# Patient Record
Sex: MALE | Race: WHITE | URBAN - METROPOLITAN AREA
[De-identification: names, ages, dates, MRNs, and addresses within clinical notes are randomized per-mention and may not be internally consistent; named-entity substitution may affect disease eponyms.]

---

## 2024-10-16 ENCOUNTER — TELEPHONE (OUTPATIENT)
Dept: GASTROENTEROLOGY | Facility: CLINIC | Age: 59
End: 2024-10-16
Payer: MEDICAID

## 2024-10-28 ENCOUNTER — TELEPHONE (OUTPATIENT)
Dept: GASTROENTEROLOGY | Facility: CLINIC | Age: 59
End: 2024-10-28
Payer: MEDICAID

## 2024-12-04 ENCOUNTER — APPOINTMENT (OUTPATIENT)
Dept: GASTROENTEROLOGY | Facility: CLINIC | Age: 59
End: 2024-12-04
Payer: MEDICAID

## 2024-12-04 VITALS
BODY MASS INDEX: 24.01 KG/M2 | HEART RATE: 72 BPM | DIASTOLIC BLOOD PRESSURE: 95 MMHG | HEIGHT: 67 IN | SYSTOLIC BLOOD PRESSURE: 141 MMHG | WEIGHT: 153 LBS

## 2024-12-04 DIAGNOSIS — Z12.11 SCREENING FOR COLON CANCER: Primary | ICD-10-CM

## 2024-12-04 PROBLEM — F10.10 ALCOHOL ABUSE: Status: RESOLVED | Noted: 2020-06-05 | Resolved: 2024-12-04

## 2024-12-04 PROBLEM — I63.9 CEREBROVASCULAR ACCIDENT (CVA) (MULTI): Status: ACTIVE | Noted: 2020-06-05

## 2024-12-04 PROBLEM — F17.200 TOBACCO DEPENDENCE SYNDROME: Status: ACTIVE | Noted: 2020-06-05

## 2024-12-04 PROBLEM — S81.809A MULTIPLE OPEN WOUNDS OF LOWER LEG: Status: ACTIVE | Noted: 2020-06-05

## 2024-12-04 PROBLEM — B95.61 BACTEREMIA DUE TO STAPHYLOCOCCUS AUREUS: Status: RESOLVED | Noted: 2020-06-05 | Resolved: 2024-12-04

## 2024-12-04 PROBLEM — E78.5 DYSLIPIDEMIA: Status: ACTIVE | Noted: 2020-06-05

## 2024-12-04 PROBLEM — F19.10 SUBSTANCE ABUSE (MULTI): Status: RESOLVED | Noted: 2020-06-05 | Resolved: 2024-12-04

## 2024-12-04 PROBLEM — F31.32 BIPOLAR AFFECTIVE DISORDER, CURRENTLY DEPRESSED, MODERATE (MULTI): Status: ACTIVE | Noted: 2020-06-05

## 2024-12-04 PROBLEM — R78.81 BACTEREMIA DUE TO STAPHYLOCOCCUS AUREUS: Status: RESOLVED | Noted: 2020-06-05 | Resolved: 2024-12-04

## 2024-12-04 PROCEDURE — 3008F BODY MASS INDEX DOCD: CPT | Performed by: INTERNAL MEDICINE

## 2024-12-04 PROCEDURE — 99203 OFFICE O/P NEW LOW 30 MIN: CPT | Performed by: INTERNAL MEDICINE

## 2024-12-04 RX ORDER — OMEPRAZOLE 20 MG/1
20 CAPSULE, DELAYED RELEASE ORAL
COMMUNITY
Start: 2024-10-10

## 2024-12-04 RX ORDER — QUETIAPINE FUMARATE 50 MG/1
50 TABLET, FILM COATED ORAL NIGHTLY
COMMUNITY
Start: 2024-10-10

## 2024-12-04 RX ORDER — LISINOPRIL 5 MG/1
1 TABLET ORAL
COMMUNITY
Start: 2024-10-10

## 2024-12-04 ASSESSMENT — ENCOUNTER SYMPTOMS
SHORTNESS OF BREATH: 0
ROS GI COMMENTS: AS DETAILED ABOVE.
ARTHRALGIAS: 0
UNEXPECTED WEIGHT CHANGE: 0
PALPITATIONS: 0
TROUBLE SWALLOWING: 0
SORE THROAT: 0
VOICE CHANGE: 0
MYALGIAS: 0
NERVOUS/ANXIOUS: 0
WHEEZING: 0
NUMBNESS: 0
CONFUSION: 0
CHILLS: 0
DIZZINESS: 0
HEADACHES: 0
WEAKNESS: 0
FEVER: 0
BRUISES/BLEEDS EASILY: 0
DYSURIA: 0
SEIZURES: 0
ADENOPATHY: 0
FATIGUE: 0
COUGH: 0
HEMATURIA: 0

## 2024-12-04 NOTE — PATIENT INSTRUCTIONS
You have been scheduled for a colonoscopy.  You were given instructions for preparing for this test in the office today.  If you have questions about these instructions, please call my office at [phone].    After your procedure, you can expect me to talk to you to go over the results of the procedure. If polyps were removed I will usually be able to tell you my initial thoughts about those polyps and give you some idea of when you should have another colonoscopy.    If any polyps are removed during your procedure or if any biopsies are obtained those specimens will go to the pathologists to review under the microscope. Once those results are available they will be sent to me electronically to review. These results will also be available to you at that time through [employer]. I briefly review all of these results by the next business day to determine if there is any urgent information that needs to be communicated to you right away or anything that would significantly change what I told you at the time of the procedure. If there is nothing urgent this is usually a good sign and I will then do a more extensive review of the result and send you a letter with my final recommendations within a week of the result becoming available. If you have questions or need additional information I urge you to call the office at [phone], but I do ask for patience as I spend a good portion of each day performing procedures like the one you are scheduled for and during those times I am not able to take or return routine phone calls.    You were also given information regarding the schedule for your procedure including the time that you need to arrive to the endoscopy unit.  You will also be contacted 2-3 day prior to your procedure to confirm the final arrival time.  If you have questions about this or if you need to cancel or change this appointment please call my office at [phone].       Follow up with GI as  needed.

## 2024-12-04 NOTE — PROGRESS NOTES
[facility] Gastroenterology    ASSESSMENT and PLAN:       Gurpreet Faye is a 59 y.o. male with a significant past medical history of CVA, HTN, and GERD who presents for consultation requested by his primary care provider (KISHOR Boo) for a discussion regarding colonoscopy.       Colon cancer screening  Due for screening colonoscopy.  Asymptomatic at this time.  No previous difficulties with sedation.  No antiplatelet/antithrombotic use. There is a family history of colon cancer in his mother.  - colonoscopy scheduled in endo  - OTC bowel prep discussed with patient  - instructions for procedure discussed with patient in the office today and hard copy given to patient today      Follow up with GI as needed.          You White MD        Senior Attending Physician, Gastroenterology    [facility]    Clinical   [facility] [employer]        Subjective   HISTORY OF PRESENT ILLNESS:     Chief Complaint  Colonoscopy    History Of Present Illness:    Gurpreet Faye is a 59 y.o. male with a significant past medical history of CVA, HTN, and GERD who presents for consultation requested by his primary care provider (KISHOR Boo) for a discussion regarding colonoscopy.     There are no notes from his PCP in the EMR. A scanned note dated 10/10/2024 states that he is due for a screening colonoscopy. He reported had a colonoscopy in 2008 that was normal. His PCP also reported a family history of colon cancer in his mother.    Today he says that he has never had a colonoscopy before. He denies any GI symptoms. He does have a history of a CVA and he has some residual difficulties walking from that, but otherwise he denies any residual deficit. He denies any history of heart disease. He denies any previous difficulties with sedation/anesthesia. No current anticoagulation or antiplatelet  use.      Patient denies any heartburn/GERD, N/V, dysphagia, odynophagia, abdominal pain, diarrhea, constipation, hematemesis, hematochezia, melena, or weight loss.    Endoscopy History:  6/2/2020 - EGD: LA grade C esophagitis, gastric edema, PEG placed, normal duodenum      Review of systems:   Review of Systems   Constitutional:  Negative for chills, fatigue, fever and unexpected weight change.   HENT:  Negative for congestion, sneezing, sore throat, trouble swallowing and voice change.    Respiratory:  Negative for cough, shortness of breath and wheezing.    Cardiovascular:  Negative for chest pain, palpitations and leg swelling.   Gastrointestinal:         As detailed above.   Genitourinary:  Negative for dysuria and hematuria.   Musculoskeletal:  Negative for arthralgias and myalgias.   Skin:  Negative for pallor and rash.   Neurological:  Negative for dizziness, seizures, syncope, weakness, numbness and headaches.        +difficulty walking   Hematological:  Negative for adenopathy. Does not bruise/bleed easily.   Psychiatric/Behavioral:  Negative for confusion. The patient is not nervous/anxious.    All other systems reviewed and are negative.      I performed a complete 10 point review of systems and it is negative except as noted in HPI or above.      PAST HISTORIES:       Past Medical History:  Patient Active Problem List   Diagnosis    Tobacco dependence syndrome    Multiple open wounds of lower leg    Dyslipidemia    Cerebrovascular accident (CVA) (Multi)    Bipolar affective disorder, currently depressed, moderate (Multi)     He has a past medical history of Bacteremia due to Staphylococcus aureus (06/05/2020).    Past Surgical History:  He has a past surgical history that includes CT angio head w and wo IV contrast (5/19/2020) and CT angio neck (5/19/2020).      Social History:  He reports that he has never smoked. He does not have any smokeless tobacco history on file. He reports that he does not  "drink alcohol and does not use drugs.    Family History:  His mother had colon cancer diagnosed after the age of 65 (around age 65 or 67). Otherwise he denies any family history of pancreatitis, Crohn's, gastroesophageal cancer, or ulcerative colitis.    Family History   Problem Relation Name Age of Onset    Colon cancer Mother          Allergies:  Patient has no known allergies.      Objective   OBJECTIVE:       Last Recorded Vitals:  Vitals:    12/04/24 1543   BP: (!) 141/95   Pulse: 72   Weight: 69.4 kg (153 lb)   Height: 1.702 m (5' 7\")     BP (!) 141/95   Pulse 72   Ht 1.702 m (5' 7\")   Wt 69.4 kg (153 lb)   BMI 23.96 kg/m²      Physical Exam:    Physical Exam  Vitals reviewed.   Constitutional:       General: He is not in acute distress.     Appearance: He is not ill-appearing.   HENT:      Head: Normocephalic and atraumatic.   Eyes:      General: No scleral icterus.  Cardiovascular:      Rate and Rhythm: Normal rate and regular rhythm.      Pulses: Normal pulses.      Heart sounds: Normal heart sounds. No murmur heard.  Pulmonary:      Effort: Pulmonary effort is normal. No respiratory distress.      Breath sounds: Normal breath sounds. No wheezing.   Abdominal:      General: Bowel sounds are normal.      Palpations: Abdomen is soft.      Tenderness: There is no abdominal tenderness. There is no rebound.      Comments: Scar from previous PEG tube   Musculoskeletal:         General: No swelling or deformity.      Comments: Slow, shuffling gait   Skin:     General: Skin is warm and dry.      Coloration: Skin is not jaundiced.      Findings: No rash.   Neurological:      General: No focal deficit present.      Mental Status: He is alert and oriented to person, place, and time.   Psychiatric:         Mood and Affect: Mood normal.         Behavior: Behavior normal.         Thought Content: Thought content normal.         Judgment: Judgment normal.         Home Medications:  Prior to Admission medications  " "  Not on File         Relevant Results Recent labs reviewed in the EMR.    Lab Results   Component Value Date/Time    WBC 8.2 06/05/2020 0348    HGB 10.0 (L) 06/05/2020 0348    HGB 10.0 (L) 06/01/2020 0904    HGB 9.3 (L) 05/31/2020 0751    MCV 95 06/05/2020 0348     06/05/2020 0348     06/01/2020 0904     05/31/2020 0751       Lab Results   Component Value Date/Time     (L) 06/05/2020 0348    K 3.5 06/05/2020 0348     06/05/2020 0348    BUN 5 (L) 06/05/2020 0348    CREATININE 1.00 06/05/2020 0348    CREATININE 1.04 06/04/2020 0407       Lab Results   Component Value Date/Time    BILITOT 0.9 05/16/2020 0619    BILITOT 1.1 05/15/2020 1727    ALKPHOS 57 05/16/2020 0619    ALKPHOS 62 05/15/2020 1727    AST 26 05/16/2020 0619    AST 28 05/15/2020 1727    ALT 29 05/16/2020 0619    ALT 33 05/15/2020 1727       No results found for: \"CRP\", \"CALPS\"    Radiology: Imaging reviewed in the EMR.        "

## 2024-12-04 NOTE — H&P (VIEW-ONLY)
[facility] Gastroenterology    ASSESSMENT and PLAN:       Gurpreet Faye is a 59 y.o. male with a significant past medical history of CVA, HTN, and GERD who presents for consultation requested by his primary care provider (KISHOR Boo) for a discussion regarding colonoscopy.       Colon cancer screening  Due for screening colonoscopy.  Asymptomatic at this time.  No previous difficulties with sedation.  No antiplatelet/antithrombotic use. There is a family history of colon cancer in his mother.  - colonoscopy scheduled in endo  - OTC bowel prep discussed with patient  - instructions for procedure discussed with patient in the office today and hard copy given to patient today      Follow up with GI as needed.          You White MD        Senior Attending Physician, Gastroenterology    [facility] [place] [facility]    Clinical   [facility] [employer]        Subjective   HISTORY OF PRESENT ILLNESS:     Chief Complaint  Colonoscopy    History Of Present Illness:    Gurpreet Faye is a 59 y.o. male with a significant past medical history of CVA, HTN, and GERD who presents for consultation requested by his primary care provider (KISHOR Boo) for a discussion regarding colonoscopy.     There are no notes from his PCP in the EMR. A scanned note dated 10/10/2024 states that he is due for a screening colonoscopy. He reported had a colonoscopy in 2008 that was normal. His PCP also reported a family history of colon cancer in his mother.    Today he says that he has never had a colonoscopy before. He denies any GI symptoms. He does have a history of a CVA and he has some residual difficulties walking from that, but otherwise he denies any residual deficit. He denies any history of heart disease. He denies any previous difficulties with sedation/anesthesia. No current anticoagulation or antiplatelet  use.      Patient denies any heartburn/GERD, N/V, dysphagia, odynophagia, abdominal pain, diarrhea, constipation, hematemesis, hematochezia, melena, or weight loss.    Endoscopy History:  6/2/2020 - EGD: LA grade C esophagitis, gastric edema, PEG placed, normal duodenum      Review of systems:   Review of Systems   Constitutional:  Negative for chills, fatigue, fever and unexpected weight change.   HENT:  Negative for congestion, sneezing, sore throat, trouble swallowing and voice change.    Respiratory:  Negative for cough, shortness of breath and wheezing.    Cardiovascular:  Negative for chest pain, palpitations and leg swelling.   Gastrointestinal:         As detailed above.   Genitourinary:  Negative for dysuria and hematuria.   Musculoskeletal:  Negative for arthralgias and myalgias.   Skin:  Negative for pallor and rash.   Neurological:  Negative for dizziness, seizures, syncope, weakness, numbness and headaches.        +difficulty walking   Hematological:  Negative for adenopathy. Does not bruise/bleed easily.   Psychiatric/Behavioral:  Negative for confusion. The patient is not nervous/anxious.    All other systems reviewed and are negative.      I performed a complete 10 point review of systems and it is negative except as noted in HPI or above.      PAST HISTORIES:       Past Medical History:  Patient Active Problem List   Diagnosis    Tobacco dependence syndrome    Multiple open wounds of lower leg    Dyslipidemia    Cerebrovascular accident (CVA) (Multi)    Bipolar affective disorder, currently depressed, moderate (Multi)     He has a past medical history of Bacteremia due to Staphylococcus aureus (06/05/2020).    Past Surgical History:  He has a past surgical history that includes CT angio head w and wo IV contrast (5/19/2020) and CT angio neck (5/19/2020).      Social History:  He reports that he has never smoked. He does not have any smokeless tobacco history on file. He reports that he does not  "drink alcohol and does not use drugs.    Family History:  His mother had colon cancer diagnosed after the age of 65 (around age 65 or 67). Otherwise he denies any family history of pancreatitis, Crohn's, gastroesophageal cancer, or ulcerative colitis.    Family History   Problem Relation Name Age of Onset    Colon cancer Mother          Allergies:  Patient has no known allergies.      Objective   OBJECTIVE:       Last Recorded Vitals:  Vitals:    12/04/24 1543   BP: (!) 141/95   Pulse: 72   Weight: 69.4 kg (153 lb)   Height: 1.702 m (5' 7\")     BP (!) 141/95   Pulse 72   Ht 1.702 m (5' 7\")   Wt 69.4 kg (153 lb)   BMI 23.96 kg/m²      Physical Exam:    Physical Exam  Vitals reviewed.   Constitutional:       General: He is not in acute distress.     Appearance: He is not ill-appearing.   HENT:      Head: Normocephalic and atraumatic.   Eyes:      General: No scleral icterus.  Cardiovascular:      Rate and Rhythm: Normal rate and regular rhythm.      Pulses: Normal pulses.      Heart sounds: Normal heart sounds. No murmur heard.  Pulmonary:      Effort: Pulmonary effort is normal. No respiratory distress.      Breath sounds: Normal breath sounds. No wheezing.   Abdominal:      General: Bowel sounds are normal.      Palpations: Abdomen is soft.      Tenderness: There is no abdominal tenderness. There is no rebound.      Comments: Scar from previous PEG tube   Musculoskeletal:         General: No swelling or deformity.      Comments: Slow, shuffling gait   Skin:     General: Skin is warm and dry.      Coloration: Skin is not jaundiced.      Findings: No rash.   Neurological:      General: No focal deficit present.      Mental Status: He is alert and oriented to person, place, and time.   Psychiatric:         Mood and Affect: Mood normal.         Behavior: Behavior normal.         Thought Content: Thought content normal.         Judgment: Judgment normal.         Home Medications:  Prior to Admission medications  " "  Not on File         Relevant Results Recent labs reviewed in the EMR.    Lab Results   Component Value Date/Time    WBC 8.2 06/05/2020 0348    HGB 10.0 (L) 06/05/2020 0348    HGB 10.0 (L) 06/01/2020 0904    HGB 9.3 (L) 05/31/2020 0751    MCV 95 06/05/2020 0348     06/05/2020 0348     06/01/2020 0904     05/31/2020 0751       Lab Results   Component Value Date/Time     (L) 06/05/2020 0348    K 3.5 06/05/2020 0348     06/05/2020 0348    BUN 5 (L) 06/05/2020 0348    CREATININE 1.00 06/05/2020 0348    CREATININE 1.04 06/04/2020 0407       Lab Results   Component Value Date/Time    BILITOT 0.9 05/16/2020 0619    BILITOT 1.1 05/15/2020 1727    ALKPHOS 57 05/16/2020 0619    ALKPHOS 62 05/15/2020 1727    AST 26 05/16/2020 0619    AST 28 05/15/2020 1727    ALT 29 05/16/2020 0619    ALT 33 05/15/2020 1727       No results found for: \"CRP\", \"CALPS\"    Radiology: Imaging reviewed in the EMR.        "

## 2024-12-19 ENCOUNTER — PREP FOR PROCEDURE (OUTPATIENT)
Facility: CLINIC | Age: 59
End: 2024-12-19
Payer: MEDICAID

## 2024-12-20 ENCOUNTER — HOSPITAL ENCOUNTER (OUTPATIENT)
Dept: GASTROENTEROLOGY | Facility: HOSPITAL | Age: 59
Discharge: HOME | End: 2024-12-20
Payer: MEDICAID

## 2024-12-20 ENCOUNTER — ANESTHESIA (OUTPATIENT)
Dept: GASTROENTEROLOGY | Facility: HOSPITAL | Age: 59
End: 2024-12-20
Payer: MEDICAID

## 2024-12-20 ENCOUNTER — ANESTHESIA EVENT (OUTPATIENT)
Dept: GASTROENTEROLOGY | Facility: HOSPITAL | Age: 59
End: 2024-12-20
Payer: MEDICAID

## 2024-12-20 VITALS
RESPIRATION RATE: 16 BRPM | HEART RATE: 52 BPM | TEMPERATURE: 97.4 F | OXYGEN SATURATION: 98 % | DIASTOLIC BLOOD PRESSURE: 80 MMHG | SYSTOLIC BLOOD PRESSURE: 140 MMHG

## 2024-12-20 DIAGNOSIS — Z12.11 ENCOUNTER FOR SCREENING FOR MALIGNANT NEOPLASM OF COLON: ICD-10-CM

## 2024-12-20 PROCEDURE — 3700000001 HC GENERAL ANESTHESIA TIME - INITIAL BASE CHARGE

## 2024-12-20 PROCEDURE — 3700000002 HC GENERAL ANESTHESIA TIME - EACH INCREMENTAL 1 MINUTE

## 2024-12-20 PROCEDURE — 45385 COLONOSCOPY W/LESION REMOVAL: CPT | Performed by: INTERNAL MEDICINE

## 2024-12-20 PROCEDURE — 7100000009 HC PHASE TWO TIME - INITIAL BASE CHARGE

## 2024-12-20 PROCEDURE — 7100000010 HC PHASE TWO TIME - EACH INCREMENTAL 1 MINUTE

## 2024-12-20 PROCEDURE — 2500000004 HC RX 250 GENERAL PHARMACY W/ HCPCS (ALT 636 FOR OP/ED): Performed by: NURSE ANESTHETIST, CERTIFIED REGISTERED

## 2024-12-20 RX ORDER — SODIUM CHLORIDE 0.9 % (FLUSH) 0.9 %
SYRINGE (ML) INJECTION AS NEEDED
Status: DISCONTINUED | OUTPATIENT
Start: 2024-12-20 | End: 2024-12-20

## 2024-12-20 RX ORDER — LIDOCAINE HYDROCHLORIDE 20 MG/ML
INJECTION, SOLUTION INFILTRATION; PERINEURAL AS NEEDED
Status: DISCONTINUED | OUTPATIENT
Start: 2024-12-20 | End: 2024-12-20

## 2024-12-20 RX ORDER — PROPOFOL 10 MG/ML
INJECTION, EMULSION INTRAVENOUS AS NEEDED
Status: DISCONTINUED | OUTPATIENT
Start: 2024-12-20 | End: 2024-12-20

## 2024-12-20 SDOH — HEALTH STABILITY: MENTAL HEALTH: CURRENT SMOKER: 0

## 2024-12-20 ASSESSMENT — COLUMBIA-SUICIDE SEVERITY RATING SCALE - C-SSRS
6. HAVE YOU EVER DONE ANYTHING, STARTED TO DO ANYTHING, OR PREPARED TO DO ANYTHING TO END YOUR LIFE?: NO
1. IN THE PAST MONTH, HAVE YOU WISHED YOU WERE DEAD OR WISHED YOU COULD GO TO SLEEP AND NOT WAKE UP?: NO
2. HAVE YOU ACTUALLY HAD ANY THOUGHTS OF KILLING YOURSELF?: NO

## 2024-12-20 ASSESSMENT — PAIN - FUNCTIONAL ASSESSMENT
PAIN_FUNCTIONAL_ASSESSMENT: 0-10

## 2024-12-20 ASSESSMENT — PAIN SCALES - GENERAL
PAINLEVEL_OUTOF10: 0 - NO PAIN
PAINLEVEL_OUTOF10: 0 - NO PAIN
PAIN_LEVEL: 0
PAINLEVEL_OUTOF10: 0 - NO PAIN

## 2024-12-20 NOTE — ANESTHESIA PREPROCEDURE EVALUATION
Patient: Gurpreet Faye    Procedure Information       Anesthesia Start Date/Time: 12/20/24 1217    Scheduled providers: You White MD    Procedure: COLONOSCOPY    Location: [facility]            Relevant Problems   Anesthesia (within normal limits)      Neuro   (+) Bipolar affective disorder, currently depressed, moderate (Multi)   (+) Cerebrovascular accident (CVA) (Multi)       Clinical information reviewed:   Tobacco  Allergies  Meds   Med Hx  Surg Hx   Fam Hx  Soc Hx        NPO Detail:  NPO/Void Status  Carbohydrate Drink Given Prior to Surgery? : N  Date of Last Liquid: 12/20/24  Time of Last Liquid: 0530  Date of Last Solid: 12/18/24  Time of Last Solid: 1800  Last Intake Type: Clear fluids  Time of Last Void: 1118         Physical Exam    Airway  Mallampati: II  TM distance: >3 FB  Neck ROM: full     Cardiovascular - normal exam  Rhythm: regular  Rate: normal     Dental - normal exam     Pulmonary - normal exam     Abdominal - normal exam             Anesthesia Plan    History of general anesthesia?: yes  History of complications of general anesthesia?: no    ASA 3     MAC     The patient is not a current smoker.    intravenous induction   Anesthetic plan and risks discussed with patient.

## 2024-12-20 NOTE — LETTER
December 30, 2024     Gurpreet Faye  [address]  [place] [address]      Dear Mr. Faye:    Below are the results from your recent visit:    The polyp that I removed during your recent colonoscopy was a tubular adenoma on pathology.  This type of polyp is not a cancer, but it is the type of polyp that could grow into a cancer over time.  Any polyps that were removed will not grow into a cancer, but having polyps like this can increase your risk of developing other polyps or eventually a cancer.  Because of that risk I would recommend that you have another colonoscopy in about 7-10 years (subcentimeter tubular adenoma) to look for other polyps.     If you have any other questions or concerns please do not hesitate to call me at my office at [phone].     Repeat Colonoscopy Interval: 7-10 years                Sincerely,        Dr. Vic Carlson for Dr. You White              Resulted Orders   Surgical Pathology Exam   Result Value Ref Range    Case Report       Surgical Pathology                                Case: [id number]                                  Authorizing Provider:  You White MD        Collected:           12/20/2024 1243              Ordering Location:     [facility]    Received:            12/20/2024 1315                                     [facility]                                                                     Pathologist:           Baldomero Lr MD                                                             Specimen:    RECTUM POLYPECTOMY                                                                         FINAL DIAGNOSIS       RECTUM, POLYPECTOMY:  - FRAGMENTS OF TUBULAR ADENOMA.              By the signature on this report, the individual or group listed as making the Final Interpretation/Diagnosis certifies that they have reviewed this case.       Clinical History       Z12.11 - Encounter for screening for malignant neoplasm of colon  "[ICD-10-CM]      Gross Description       A: Received in formalin, labeled with the patient's name and hospital number and \"rectal polyp\", is one light tan polypoid, soft tissue fragment measuring 0.7 x 0.4 x 0.3 cm.  The line of resection is inked.  The specimen is submitted in toto in one cassette.  LMP              "

## 2024-12-20 NOTE — ANESTHESIA POSTPROCEDURE EVALUATION
Patient: Gurpreet Faye    Procedure Summary       Date: 12/20/24 Room / Location: [facility]    Anesthesia Start: 1217 Anesthesia Stop: 1252    Procedure: COLONOSCOPY Diagnosis: Encounter for screening for malignant neoplasm of colon    Scheduled Providers: You White MD Responsible Provider: JUAN Cristina    Anesthesia Type: MAC ASA Status: 3            Anesthesia Type: MAC    Vitals Value Taken Time   /80 12/20/24 1312   Temp 36.3 °C (97.4 °F) 12/20/24 1312   Pulse 52 12/20/24 1312   Resp 16 12/20/24 1312   SpO2 98 % 12/20/24 1312       Anesthesia Post Evaluation    Patient location during evaluation: bedside  Patient participation: complete - patient participated  Level of consciousness: awake and alert  Pain score: 0  Pain management: adequate  Airway patency: patent  Cardiovascular status: acceptable and hemodynamically stable  Respiratory status: acceptable  Hydration status: acceptable  Postoperative Nausea and Vomiting: none        There were no known notable events for this encounter.

## 2024-12-23 NOTE — ADDENDUM NOTE
Encounter addended by: Regina Perera RN on: 12/23/2024 9:18 AM   Actions taken: Contacts section saved, Flowsheet accepted

## 2024-12-30 LAB
LABORATORY COMMENT REPORT: NORMAL
PATH REPORT.FINAL DX SPEC: NORMAL
PATH REPORT.GROSS SPEC: NORMAL
PATH REPORT.RELEVANT HX SPEC: NORMAL
PATH REPORT.TOTAL CANCER: NORMAL

## 2025-03-13 ENCOUNTER — HOSPITAL ENCOUNTER (OUTPATIENT)
Dept: RADIOLOGY | Facility: CLINIC | Age: 60
Discharge: HOME | End: 2025-03-13
Payer: MEDICAID

## 2025-03-13 DIAGNOSIS — F17.210 NICOTINE DEPENDENCE, CIGARETTES, UNCOMPLICATED: ICD-10-CM

## 2025-03-13 PROCEDURE — 71271 CT THORAX LUNG CANCER SCR C-: CPT | Performed by: STUDENT IN AN ORGANIZED HEALTH CARE EDUCATION/TRAINING PROGRAM

## 2025-03-13 PROCEDURE — 71271 CT THORAX LUNG CANCER SCR C-: CPT
